# Patient Record
Sex: FEMALE | Race: WHITE | URBAN - NONMETROPOLITAN AREA
[De-identification: names, ages, dates, MRNs, and addresses within clinical notes are randomized per-mention and may not be internally consistent; named-entity substitution may affect disease eponyms.]

---

## 2021-07-23 ENCOUNTER — IMPORTED ENCOUNTER (OUTPATIENT)
Dept: URBAN - NONMETROPOLITAN AREA CLINIC 1 | Facility: CLINIC | Age: 36
End: 2021-07-23

## 2021-07-23 PROBLEM — H16.011: Noted: 2021-07-23

## 2021-07-23 PROCEDURE — 99203 OFFICE O/P NEW LOW 30 MIN: CPT

## 2021-07-23 NOTE — PATIENT DISCUSSION
Central Corneal Ulcer OD-  discussed findings w/patient-  start Polytrim Q15 mo x 1 hour then q1 hour x 3 hours then q2 hours-  start Moxifloxacin QID -  Patient needs to be seen by Monday

## 2022-04-09 ASSESSMENT — VISUAL ACUITY
OD_CC: 20/200
OS_CC: 20/40